# Patient Record
Sex: MALE | URBAN - METROPOLITAN AREA
[De-identification: names, ages, dates, MRNs, and addresses within clinical notes are randomized per-mention and may not be internally consistent; named-entity substitution may affect disease eponyms.]

---

## 2019-10-24 ENCOUNTER — TELEPHONE (OUTPATIENT)
Dept: OPHTHALMOLOGY | Facility: CLINIC | Age: 59
End: 2019-10-24

## 2019-10-24 ENCOUNTER — MEDICAL CORRESPONDENCE (OUTPATIENT)
Dept: HEALTH INFORMATION MANAGEMENT | Facility: CLINIC | Age: 59
End: 2019-10-24

## 2019-10-24 ENCOUNTER — TRANSFERRED RECORDS (OUTPATIENT)
Dept: HEALTH INFORMATION MANAGEMENT | Facility: CLINIC | Age: 59
End: 2019-10-24

## 2019-10-24 NOTE — TELEPHONE ENCOUNTER
Health Call Center    Phone Message    May a detailed message be left on voicemail: yes    Reason for Call: Other: Pt is referred by Dr Sam Gonsalves of [facility] to the Retinal clinic for an urgent consult for left eye edema and diabetic macular edema. Needs within 2 weeks.Records and referral faxed to clinic for review, thanks!     Action Taken: Message routed to:  [facility]     Called pt. New med, beovu, is not for DME, only [facility]. Pt does not wish to come here if the medication is not for him. He will f/u with Dr Gonsalves. JOSE at Petros' office to call him for f/u.  MARY Glynn COT 12:17 PM October 29, 2019

## 2019-11-18 DIAGNOSIS — E11.311 DIABETIC MACULAR EDEMA (H): Primary | ICD-10-CM

## 2019-11-20 ENCOUNTER — OFFICE VISIT (OUTPATIENT)
Dept: OPHTHALMOLOGY | Facility: CLINIC | Age: 59
End: 2019-11-20
Attending: OPHTHALMOLOGY
Payer: COMMERCIAL

## 2019-11-20 DIAGNOSIS — H35.352 CME (CYSTOID MACULAR EDEMA), LEFT: Primary | ICD-10-CM

## 2019-11-20 DIAGNOSIS — C69.32 CHOROID MELANOMA OF LEFT EYE (H): ICD-10-CM

## 2019-11-20 DIAGNOSIS — Z96.1 PSEUDOPHAKIA OF LEFT EYE: ICD-10-CM

## 2019-11-20 DIAGNOSIS — E11.311 DIABETIC MACULAR EDEMA (H): ICD-10-CM

## 2019-11-20 PROBLEM — R10.9 ABDOMINAL PAIN: Status: ACTIVE | Noted: 2019-01-10

## 2019-11-20 PROBLEM — K43.9 VENTRAL HERNIA WITHOUT OBSTRUCTION OR GANGRENE: Status: ACTIVE | Noted: 2019-01-31

## 2019-11-20 PROBLEM — Q61.3 POLYCYSTIC KIDNEY DISEASE: Status: ACTIVE | Noted: 2017-05-02

## 2019-11-20 PROBLEM — E66.9 OBESITY (BMI 30-39.9): Status: ACTIVE | Noted: 2018-07-20

## 2019-11-20 PROBLEM — H25.10 SENILE NUCLEAR SCLEROSIS: Status: ACTIVE | Noted: 2017-02-09

## 2019-11-20 PROBLEM — N18.4 CKD (CHRONIC KIDNEY DISEASE) STAGE 4, GFR 15-29 ML/MIN (H): Status: ACTIVE | Noted: 2017-12-01

## 2019-11-20 PROBLEM — D31.30 BENIGN NEOPLASM OF CHOROID: Status: ACTIVE | Noted: 2019-11-20

## 2019-11-20 PROBLEM — I10 ESSENTIAL HYPERTENSION: Status: ACTIVE | Noted: 2019-11-20

## 2019-11-20 PROBLEM — H25.9 SENILE CATARACT: Status: ACTIVE | Noted: 2019-11-20

## 2019-11-20 PROBLEM — H35.372 EPIRETINAL MEMBRANE (ERM) OF LEFT EYE: Status: ACTIVE | Noted: 2018-05-09

## 2019-11-20 PROBLEM — H52.4 PRESBYOPIA: Status: ACTIVE | Noted: 2019-11-20

## 2019-11-20 PROCEDURE — 92235 FLUORESCEIN ANGRPH MLTIFRAME: CPT | Mod: ZF | Performed by: OPHTHALMOLOGY

## 2019-11-20 PROCEDURE — 25000125 ZZHC RX 250: Mod: ZF | Performed by: OPHTHALMOLOGY

## 2019-11-20 PROCEDURE — 67028 INJECTION EYE DRUG: CPT | Mod: LT,ZF | Performed by: OPHTHALMOLOGY

## 2019-11-20 PROCEDURE — 25000128 H RX IP 250 OP 636: Mod: ZF | Performed by: OPHTHALMOLOGY

## 2019-11-20 PROCEDURE — G0463 HOSPITAL OUTPT CLINIC VISIT: HCPCS | Mod: ZF,25

## 2019-11-20 PROCEDURE — 92134 CPTRZ OPH DX IMG PST SGM RTA: CPT | Mod: ZF | Performed by: OPHTHALMOLOGY

## 2019-11-20 RX ORDER — CARVEDILOL 25 MG/1
TABLET ORAL
Refills: 2 | COMMUNITY
Start: 2019-04-11

## 2019-11-20 RX ORDER — FUROSEMIDE 20 MG
TABLET ORAL
Refills: 0 | COMMUNITY
Start: 2019-10-25 | End: 2019-11-20

## 2019-11-20 RX ORDER — CLONIDINE HYDROCHLORIDE 0.1 MG/1
0.1 TABLET ORAL 3 TIMES DAILY
COMMUNITY

## 2019-11-20 RX ORDER — LISINOPRIL 2.5 MG/1
TABLET ORAL
Refills: 3 | COMMUNITY
Start: 2019-01-13 | End: 2019-11-20

## 2019-11-20 RX ORDER — POTASSIUM CHLORIDE 750 MG/1
1 CAPSULE, EXTENDED RELEASE ORAL 2 TIMES DAILY
Refills: 4 | COMMUNITY
Start: 2019-03-16

## 2019-11-20 RX ORDER — LIDOCAINE HYDROCHLORIDE 20 MG/ML
0.5 INJECTION, SOLUTION EPIDURAL; INFILTRATION; INTRACAUDAL; PERINEURAL
Status: DISPENSED | OUTPATIENT
Start: 2019-11-20 | End: 2020-11-14

## 2019-11-20 RX ORDER — MONTELUKAST SODIUM 10 MG/1
10 TABLET ORAL
COMMUNITY
Start: 2018-07-02 | End: 2019-11-20

## 2019-11-20 RX ORDER — LIRAGLUTIDE 6 MG/ML
INJECTION SUBCUTANEOUS
Refills: 4 | COMMUNITY
Start: 2018-11-27 | End: 2019-11-20

## 2019-11-20 RX ORDER — ALLOPURINOL 100 MG/1
TABLET ORAL
COMMUNITY
Start: 2018-10-09

## 2019-11-20 RX ORDER — BLOOD-GLUCOSE METER
EACH MISCELLANEOUS
COMMUNITY

## 2019-11-20 RX ORDER — MINERAL OIL/HYDROPHIL PETROLAT
OINTMENT (GRAM) TOPICAL
COMMUNITY
Start: 2019-01-10 | End: 2019-11-20

## 2019-11-20 RX ORDER — PEN NEEDLE, DIABETIC 31 GX5/16"
NEEDLE, DISPOSABLE MISCELLANEOUS
Refills: 3 | COMMUNITY
Start: 2019-05-14

## 2019-11-20 RX ORDER — CARVEDILOL 25 MG/1
25 TABLET ORAL
COMMUNITY
Start: 2019-10-24 | End: 2019-11-20

## 2019-11-20 RX ORDER — METOLAZONE 2.5 MG/1
TABLET ORAL
Refills: 4 | COMMUNITY
Start: 2019-10-25

## 2019-11-20 RX ORDER — CLONIDINE 0.3 MG/24H
0.3 PATCH, EXTENDED RELEASE TRANSDERMAL
COMMUNITY
Start: 2019-10-24 | End: 2019-11-20

## 2019-11-20 RX ORDER — HYDRALAZINE HYDROCHLORIDE 100 MG/1
TABLET, FILM COATED ORAL
Refills: 3 | COMMUNITY
Start: 2019-04-11

## 2019-11-20 RX ORDER — HYDROCODONE BITARTRATE AND ACETAMINOPHEN 5; 325 MG/1; MG/1
1-2 TABLET ORAL
COMMUNITY
Start: 2019-04-11 | End: 2019-11-20

## 2019-11-20 RX ORDER — LOVASTATIN 40 MG
TABLET ORAL
Refills: 3 | COMMUNITY
Start: 2019-04-11 | End: 2019-11-20

## 2019-11-20 RX ORDER — NEOMYCIN SULFATE, POLYMYXIN B SULFATE, AND DEXAMETHASONE 3.5; 10000; 1 MG/G; [USP'U]/G; MG/G
OINTMENT OPHTHALMIC
COMMUNITY
Start: 2018-12-13 | End: 2019-11-20

## 2019-11-20 RX ORDER — PRAZOSIN HYDROCHLORIDE 2 MG/1
CAPSULE ORAL
Refills: 3 | COMMUNITY
Start: 2019-01-13

## 2019-11-20 RX ORDER — GABAPENTIN 100 MG/1
100 CAPSULE ORAL
COMMUNITY
Start: 2019-01-31 | End: 2019-11-20

## 2019-11-20 RX ORDER — ALLOPURINOL 100 MG/1
100 TABLET ORAL DAILY
Refills: 2 | COMMUNITY
Start: 2019-04-11 | End: 2019-11-20

## 2019-11-20 RX ADMIN — DEXAMETHASONE 0.7 MG: 0.7 IMPLANT INTRAVITREAL at 11:27

## 2019-11-20 RX ADMIN — LIDOCAINE HYDROCHLORIDE 0.5 ML: 20 INJECTION, SOLUTION EPIDURAL; INFILTRATION; INTRACAUDAL; PERINEURAL at 11:27

## 2019-11-20 ASSESSMENT — CONF VISUAL FIELD
METHOD: COUNTING FINGERS
OS_INFERIOR_TEMPORAL_RESTRICTION: 3
OS_SUPERIOR_TEMPORAL_RESTRICTION: 3
OD_NORMAL: 1

## 2019-11-20 ASSESSMENT — CUP TO DISC RATIO
OD_RATIO: 0.6
OS_RATIO: 0.7

## 2019-11-20 ASSESSMENT — REFRACTION_WEARINGRX
SPECS_TYPE: PAL
OD_AXIS: 025
OD_CYLINDER: +0.25
OS_SPHERE: -4.00
OS_ADD: +2.50
OS_AXIS: 075
OD_SPHERE: -4.75
OS_CYLINDER: +0.75
OD_ADD: +2.50

## 2019-11-20 ASSESSMENT — VISUAL ACUITY
OS_CC: J16
OD_CC+: -2
OD_CC: 20/20
OS_CC: 20/125
CORRECTION_TYPE: GLASSES
METHOD: SNELLEN - LINEAR
OD_CC: J3

## 2019-11-20 ASSESSMENT — SLIT LAMP EXAM - LIDS
COMMENTS: INFERIOR SCLERAL SHOW
COMMENTS: INFERIOR SCLERAL SHOW

## 2019-11-20 ASSESSMENT — TONOMETRY
OS_IOP_MMHG: 15
OD_IOP_MMHG: 20
IOP_METHOD: TONOPEN

## 2019-11-20 NOTE — LETTER
11/20/2019       RE: Charan Cuenca  [facility] [place]  [address] [facility] [address]     Dear Sam,    Thank you for referring your patient, Charan Cuenca, to the EYE CLINIC at [facility]. Please see a copy of my visit note below.       CC: Blurry vision, DME    HPI: Charan Cuenca is a  59 year old year-old patient referred by Dr Sam Gonsalves for evaluation and treat of DMII with macular edema. Has not had improvement with Avastin, steroid or Eylea injections so was sent here for further evaluation and management. Was receiving Eylea every 2-3 weeks for several months. Has been using Pred Forte 6x/day in the left eye. 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 States     Last A1c 6.2% 1-2 months ago per patient.    Retinal Imaging:  OCT 11-20-19  RE: No IRF/SRF, normal foveal contour   LE: Central macroaneurysm with surrounding IRF, ERM with distorted retinal contour    FA 11-20-19  right eye: Few scattered MA's, no capillary dropout  left eye: Superior and nasal scars, mild leakage in superior macula    Assessment & Plan:    1. 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 Was receiving Eylea every 2.5 weeks with Dr. Gonsalves   - Plan for Ozurdex today    2. Hx of choroidal melanoma, left eye   - Treated with plaque radiation in [place], [place] around 2458-5010   - No hx of recurrence   - Follows with Dr. Sam Gonsalves regularly    3. Pseudophakia, Left Eye   - CE/IOL in 2018   - Stable    Return to clinic in 6 weeks with Optical Coherence Tomography. Patient might choose to follow up with Dr. Gonsalves         Again, thank you for allowing me to participate in the care of your patient.       Sincerely,        Nelsy Baumann MD   of Ophthalmology.  Retina Service   Department of Ophthalmology and Visual Neurosciences   [facility]  Phone: [phone]   Fax: [phone]

## 2019-11-20 NOTE — PROGRESS NOTES
CC: Blurry vision, DME    HPI: Charan Cuenca is a  59 year old year-old patient referred by Dr Sam Gonsalves for evaluation and treat of DMII with macular edema. Has not had improvement with Avastin, steroid or Eylea injections so was sent here for further evaluation and management. Was receiving Eylea every 2-3 weeks for several months. Has been using Pred Forte 6x/day in the left eye. 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 States     Last A1c 6.2% 1-2 months ago per patient.    Retinal Imaging:  OCT 11-20-19  RE: No IRF/SRF, normal foveal contour   LE: Central macroaneurysm with surrounding IRF, ERM with distorted retinal contour    FA 11-20-19  right eye: Few scattered MA's, no capillary dropout  left eye: Superior and nasal scars, mild leakage in superior macula    Assessment & Plan:    1. 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 Was receiving Eylea every 2.5 weeks with Dr. Gonsalves   - Plan for Ozurdex today    2. Hx of choroidal melanoma, left eye   - Treated with plaque radiation in [place], [place] around 5156-7059   - No hx of recurrence   - Follows with Dr. Sam Gonsalves regularly    3. Pseudophakia, Left Eye   - CE/IOL in 2018   - Stable    Return to clinic in 6 weeks with Optical Coherence Tomography. Patient might choose to follow up with Dr. Major Cotton MD  Ophthalmology Resident, PGY-3    ~~~~~~~~~~~~~~~~~~~~~~~~~~~~~~~~~~   Complete documentation of historical and exam elements from today's encounter can be found in the full encounter summary report (not reduplicated in this progress note).  I personally obtained the chief complaint(s) and history of present illness.  I confirmed and  edited as necessary the review of systems, past medical/surgical history, family history, social history, and examination findings as documented by others; and I examined the patient myself.  I personally reviewed the relevant tests, images, and reports as documented above.  I personally reviewed the ophthalmic test(s) associated with this encounter, agree with the interpretation(s) as documented by the resident/fellow, and have edited the corresponding report(s) as necessary.   I formulated and edited as necessary the assessment and plan and discussed the findings and management plan with the patient and family and No resident or fellow assisted with the procedures performed.  I performed the procedures myself.    Nelsy Baumann MD   of Ophthalmology.  Retina Service   Department of Ophthalmology and Visual Neurosciences   [facility]  Phone: [phone]   Fax: [phone]

## 2019-11-20 NOTE — NURSING NOTE
Chief Complaints and History of Present Illnesses   Patient presents with     Diabetic Macular Edema Evaluation     Chief Complaint(s) and History of Present Illness(es)     Diabetic Macular Edema Evaluation     Laterality: left eye    Frequency: constantly    Timing: throughout the day    Course: stable    Associated symptoms: headache.  Negative for eye pain, dryness, redness and tearing    Pain scale: 0/10              Comments     Chief Complaints and History of Present Illnesses   Patient presents with     Diabetic Macular Edema Evaluation     Chief Complaint(s) and History of Present Illness(es)     Diabetic Macular Edema Evaluation     Laterality: left eye    Frequency: constantly    Timing: throughout the day    Course: stable    Associated symptoms: headache.  Negative for eye pain, dryness, redness and tearing    Pain scale: 0/10              Comments     Chief Complaints and History of Present Illnesses   Patient presents with     Diabetic Macular Edema Evaluation     Chief Complaint(s) and History of Present Illness(es)     Diabetic Macular Edema Evaluation     Laterality: left eye    Frequency: constantly    Timing: throughout the day    Course: stable    Associated symptoms: headache.  Negative for eye pain, dryness, redness and tearing    Pain scale: 0/10              Comments     'Dr. Sam Gonsalves from [place], [place], sent me because he thinks there might 'be something growing' in LE again'.  Pt states that cancer was discovered in LE about 10 years ago. Received treatment, surgical intervention,  by physician at [place] for a week[place]  Has been receiving Eyela, which pt states is not helping VA.  Prednisolone 5 times a day or more. States 6 times a day is what the order is.   Pt states from his perspective that VA seems stable.  For last 3 months, A few 'small headaches' on left side of head.  Last A1C; 6.2 about 6 weeks ago.  Avg FBS: 120  Arabella Joyner, COT COT 8:38 AM  11/20/2019

## 2020-01-08 ENCOUNTER — OFFICE VISIT (OUTPATIENT)
Dept: OPHTHALMOLOGY | Facility: CLINIC | Age: 60
End: 2020-01-08
Attending: OPHTHALMOLOGY
Payer: COMMERCIAL

## 2020-01-08 DIAGNOSIS — H35.352 CME (CYSTOID MACULAR EDEMA), LEFT: ICD-10-CM

## 2020-01-08 PROCEDURE — 92134 CPTRZ OPH DX IMG PST SGM RTA: CPT | Mod: ZF | Performed by: OPHTHALMOLOGY

## 2020-01-08 PROCEDURE — G0463 HOSPITAL OUTPT CLINIC VISIT: HCPCS | Mod: ZF

## 2020-01-08 ASSESSMENT — VISUAL ACUITY
OD_CC: 20/25
OS_CC: 20/125
METHOD: SNELLEN - LINEAR
OS_CC+: -1
CORRECTION_TYPE: GLASSES

## 2020-01-08 ASSESSMENT — TONOMETRY
OD_IOP_MMHG: 17
IOP_METHOD: TONOPEN
OS_IOP_MMHG: 17

## 2020-01-08 ASSESSMENT — CONF VISUAL FIELD
OS_INFERIOR_TEMPORAL_RESTRICTION: 3
OD_NORMAL: 1
OS_INFERIOR_NASAL_RESTRICTION: 3

## 2020-01-08 ASSESSMENT — REFRACTION_WEARINGRX
SPECS_TYPE: PAL
OD_ADD: +2.50
OS_CYLINDER: +0.75
OD_CYLINDER: +0.25
OD_SPHERE: -4.75
OS_ADD: +2.50
OS_AXIS: 075
OS_SPHERE: -4.00
OD_AXIS: 025

## 2020-01-08 ASSESSMENT — CUP TO DISC RATIO
OD_RATIO: 0.6
OS_RATIO: 0.7

## 2020-01-08 ASSESSMENT — SLIT LAMP EXAM - LIDS
COMMENTS: INFERIOR SCLERAL SHOW
COMMENTS: INFERIOR SCLERAL SHOW

## 2020-01-08 NOTE — NURSING NOTE
Chief Complaints and History of Present Illnesses   Patient presents with     Cystoid Macular Edema Evaluation     Chief Complaint(s) and History of Present Illness(es)     Cystoid Macular Edema Evaluation     Laterality: left eye    Onset: 2 months ago              Comments     Pt. States that he is doing well.  No pain but some dryness and itching BE.  No change in VA BE.  No flashes or floaters BE.  Nora Salinas COT 8:57 AM January 8, 2020

## 2020-01-08 NOTE — LETTER
1/8/2020       RE: Charan Cuenca  [facility]  [address] [facility] [address]     Dear Colleague,    Thank you for referring your patient, Charan Cuenca, to the EYE CLINIC at [facility]. Please see a copy of my visit note below.       CC: Blurry vision, DME    HPI: Charan Cuenca is a  59 year old year-old patient referred by Dr Sam Gonsalves for evaluation and treat of DMII with macular edema. Has not had improvement with Avastin, steroid or Eylea injections so was sent here for further evaluation and management. Was receiving Eylea every 2-3 weeks for several months. Has been using Pred Forte 6x/day in the left eye. 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 States     Last A1c 6.2% 1-2 months ago per patient.    Retinal Imaging:  OCT 1-8-20  RE: No IRF/SRF, normal foveal contour   LE: Central macroaneurysm with surrounding IRF, ERM with distorted retinal contour    FA 11-20-19  right eye: Few scattered MA's, no capillary dropout  left eye: Superior and nasal scars, mild leakage in superior macula    Assessment & Plan:    1. 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 Was receiving Eylea every 2.5 weeks with Dr. Gonsalves   - status post Ozurdex 11.20.18 without  Significant improvement   - possible chronic cystic changes    - consider observation for now and re-start eylea or avastin if worsening intraretinal fluid     2. Hx of choroidal melanoma, left eye   - Treated with plaque radiation in [place], [place] around 9840-9755   - No hx of recurrence   - Follows with Dr. Sam Gonsalves regularly    3. Pseudophakia, Left Eye   - CE/IOL in 2018   - Stable    Return to clinic in 6 weeks with  Dr. Gonsalves   Patient states would like a phone call from dr. Gonsalves's office for the arthur     ~~~~~~~~~~~~~~~~~~~~~~~~~~~~~~~~~~  Complete documentation of historical and exam elements from today's encounter can be found in the full encounter summary report (not reduplicated in this progress note).  I personally obtained the chief complaint(s) and history of present illness.  I confirmed and edited as necessary the review of systems, past medical/surgical history, family history, social history, and examination findings as documented by others; and I examined the patient myself.  I personally reviewed the relevant tests, images, and reports as documented above.  I formulated and edited as necessary the assessment and plan and discussed the findings and management plan with the patient and family Nelsy Baumann MD    Again, thank you for allowing me to participate in the care of your patient.      Sincerely,    Nelsy Baumann M.D.   of Ophthalmology  Vitreoretinal Surgeon  [facility]  Department of Ophthalmology & Visual Neurosciences  [facility]  Phone:  [phone]   Fax:  [phone]

## 2020-01-08 NOTE — PROGRESS NOTES
CC: Blurry vision, DME    HPI: Charan Cuenca is a  59 year old year-old patient referred by Dr Sam Gonsalves for evaluation and treat of DMII with macular edema. Has not had improvement with Avastin, steroid or Eylea injections so was sent here for further evaluation and management. Was receiving Eylea every 2-3 weeks for several months. Has been using Pred Forte 6x/day in the left eye. 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 States     Last A1c 6.2% 1-2 months ago per patient.    Retinal Imaging:  OCT 1-8-20  RE: No IRF/SRF, normal foveal contour   LE: Central macroaneurysm with surrounding IRF, ERM with distorted retinal contour    FA 11-20-19  right eye: Few scattered MA's, no capillary dropout  left eye: Superior and nasal scars, mild leakage in superior macula    Assessment & Plan:    1. 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 Was receiving Eylea every 2.5 weeks with Dr. Gonsalves   - status post Ozurdex 11.20.18 without  Significant improvement   - possible chronic cystic changes    - consider observation for now and re-start eylea or avastin if worsening intraretinal fluid     2. Hx of choroidal melanoma, left eye   - Treated with plaque radiation in [place], [place] around 9016-[address]37   - No hx of recurrence   - Follows with Dr. Sam Gonsalves regularly    3. Pseudophakia, Left Eye   - CE/IOL in 2018   - Stable    Return to clinic in 6 weeks with Dr. Gonsalves     ~~~~~~~~~~~~~~~~~~~~~~~~~~~~~~~~~~   Complete documentation of historical and exam elements from today's encounter can be found in the full encounter summary report (not reduplicated in this progress note).  I personally obtained the chief complaint(s) and  history of present illness.  I confirmed and edited as necessary the review of systems, past medical/surgical history, family history, social history, and examination findings as documented by others; and I examined the patient myself.  I personally reviewed the relevant tests, images, and reports as documented above.  I formulated and edited as necessary the assessment and plan and discussed the findings and management plan with the patient and family    Nelsy Baumann MD   of Ophthalmology.  Retina Service   Department of Ophthalmology and Visual Neurosciences   [facility]  Phone: [phone]   Fax: [phone]